# Patient Record
Sex: FEMALE | Race: WHITE | NOT HISPANIC OR LATINO | URBAN - METROPOLITAN AREA
[De-identification: names, ages, dates, MRNs, and addresses within clinical notes are randomized per-mention and may not be internally consistent; named-entity substitution may affect disease eponyms.]

---

## 2019-07-11 ENCOUNTER — OFFICE (AMBULATORY)
Dept: URBAN - METROPOLITAN AREA CLINIC 75 | Facility: CLINIC | Age: 24
End: 2019-07-11

## 2019-07-11 VITALS
HEIGHT: 65 IN | RESPIRATION RATE: 14 BRPM | DIASTOLIC BLOOD PRESSURE: 86 MMHG | SYSTOLIC BLOOD PRESSURE: 126 MMHG | HEART RATE: 69 BPM | WEIGHT: 122 LBS

## 2019-07-11 DIAGNOSIS — R13.10 DYSPHAGIA, UNSPECIFIED: ICD-10-CM

## 2019-07-11 DIAGNOSIS — K59.00 CONSTIPATION, UNSPECIFIED: ICD-10-CM

## 2019-07-11 DIAGNOSIS — R10.10 UPPER ABDOMINAL PAIN, UNSPECIFIED: ICD-10-CM

## 2019-07-11 DIAGNOSIS — R19.5 OTHER FECAL ABNORMALITIES: ICD-10-CM

## 2019-07-11 DIAGNOSIS — K21.9 GASTRO-ESOPHAGEAL REFLUX DISEASE WITHOUT ESOPHAGITIS: ICD-10-CM

## 2019-07-11 PROCEDURE — 99203 OFFICE O/P NEW LOW 30 MIN: CPT | Performed by: INTERNAL MEDICINE

## 2019-07-16 VITALS
HEART RATE: 92 BPM | WEIGHT: 122 LBS | SYSTOLIC BLOOD PRESSURE: 122 MMHG | OXYGEN SATURATION: 95 % | DIASTOLIC BLOOD PRESSURE: 78 MMHG | HEART RATE: 78 BPM | DIASTOLIC BLOOD PRESSURE: 92 MMHG | SYSTOLIC BLOOD PRESSURE: 112 MMHG | SYSTOLIC BLOOD PRESSURE: 128 MMHG | HEART RATE: 74 BPM | RESPIRATION RATE: 16 BRPM | RESPIRATION RATE: 13 BRPM | SYSTOLIC BLOOD PRESSURE: 129 MMHG | DIASTOLIC BLOOD PRESSURE: 57 MMHG | OXYGEN SATURATION: 93 % | TEMPERATURE: 99.2 F | HEART RATE: 75 BPM | RESPIRATION RATE: 22 BRPM | DIASTOLIC BLOOD PRESSURE: 74 MMHG | HEART RATE: 94 BPM | SYSTOLIC BLOOD PRESSURE: 138 MMHG | OXYGEN SATURATION: 96 % | TEMPERATURE: 98 F | HEART RATE: 90 BPM | DIASTOLIC BLOOD PRESSURE: 85 MMHG | HEART RATE: 89 BPM | OXYGEN SATURATION: 100 % | HEIGHT: 65 IN | DIASTOLIC BLOOD PRESSURE: 89 MMHG | OXYGEN SATURATION: 97 % | SYSTOLIC BLOOD PRESSURE: 107 MMHG

## 2019-07-17 ENCOUNTER — AMBULATORY SURGICAL CENTER (AMBULATORY)
Dept: URBAN - METROPOLITAN AREA SURGERY 17 | Facility: SURGERY | Age: 24
End: 2019-07-17

## 2019-07-17 ENCOUNTER — OFFICE (AMBULATORY)
Dept: URBAN - METROPOLITAN AREA PATHOLOGY 4 | Facility: PATHOLOGY | Age: 24
End: 2019-07-17

## 2019-07-17 DIAGNOSIS — R10.10 UPPER ABDOMINAL PAIN, UNSPECIFIED: ICD-10-CM

## 2019-07-17 DIAGNOSIS — R13.10 DYSPHAGIA, UNSPECIFIED: ICD-10-CM

## 2019-07-17 DIAGNOSIS — K29.50 UNSPECIFIED CHRONIC GASTRITIS WITHOUT BLEEDING: ICD-10-CM

## 2019-07-17 DIAGNOSIS — K21.9 GASTRO-ESOPHAGEAL REFLUX DISEASE WITHOUT ESOPHAGITIS: ICD-10-CM

## 2019-07-17 LAB
GI HISTOLOGY: A. SELECT: (no result)
GI HISTOLOGY: B. SELECT: (no result)
GI HISTOLOGY: C. SELECT: (no result)
GI HISTOLOGY: PDF REPORT: (no result)

## 2019-07-17 PROCEDURE — 43239 EGD BIOPSY SINGLE/MULTIPLE: CPT | Performed by: INTERNAL MEDICINE

## 2019-07-17 PROCEDURE — 88305 TISSUE EXAM BY PATHOLOGIST: CPT | Performed by: INTERNAL MEDICINE

## 2023-12-01 ENCOUNTER — OFFICE (AMBULATORY)
Dept: URBAN - METROPOLITAN AREA CLINIC 76 | Facility: CLINIC | Age: 28
End: 2023-12-01

## 2023-12-01 VITALS
DIASTOLIC BLOOD PRESSURE: 79 MMHG | WEIGHT: 124 LBS | SYSTOLIC BLOOD PRESSURE: 118 MMHG | OXYGEN SATURATION: 98 % | HEIGHT: 65 IN | HEART RATE: 88 BPM

## 2023-12-01 DIAGNOSIS — D50.9 IRON DEFICIENCY ANEMIA, UNSPECIFIED: ICD-10-CM

## 2023-12-01 DIAGNOSIS — R10.32 LEFT LOWER QUADRANT PAIN: ICD-10-CM

## 2023-12-01 DIAGNOSIS — R19.4 CHANGE IN BOWEL HABIT: ICD-10-CM

## 2023-12-01 DIAGNOSIS — R19.7 DIARRHEA, UNSPECIFIED: ICD-10-CM

## 2023-12-01 DIAGNOSIS — K21.9 GASTRO-ESOPHAGEAL REFLUX DISEASE WITHOUT ESOPHAGITIS: ICD-10-CM

## 2023-12-01 PROCEDURE — 99204 OFFICE O/P NEW MOD 45 MIN: CPT | Performed by: NURSE PRACTITIONER

## 2023-12-01 RX ORDER — RIFAXIMIN 550 MG/1
TABLET ORAL
Qty: 42 | Refills: 2 | Status: COMPLETED
Start: 2023-12-01 | End: 2023-12-20

## 2023-12-20 VITALS
DIASTOLIC BLOOD PRESSURE: 85 MMHG | DIASTOLIC BLOOD PRESSURE: 82 MMHG | RESPIRATION RATE: 18 BRPM | DIASTOLIC BLOOD PRESSURE: 93 MMHG | RESPIRATION RATE: 7 BRPM | SYSTOLIC BLOOD PRESSURE: 176 MMHG | OXYGEN SATURATION: 100 % | DIASTOLIC BLOOD PRESSURE: 84 MMHG | RESPIRATION RATE: 20 BRPM | RESPIRATION RATE: 16 BRPM | SYSTOLIC BLOOD PRESSURE: 123 MMHG | SYSTOLIC BLOOD PRESSURE: 140 MMHG | HEART RATE: 62 BPM | HEIGHT: 65 IN | HEART RATE: 87 BPM | WEIGHT: 124 LBS | SYSTOLIC BLOOD PRESSURE: 135 MMHG | DIASTOLIC BLOOD PRESSURE: 88 MMHG | DIASTOLIC BLOOD PRESSURE: 92 MMHG | SYSTOLIC BLOOD PRESSURE: 138 MMHG | HEART RATE: 67 BPM | HEART RATE: 73 BPM | RESPIRATION RATE: 14 BRPM | DIASTOLIC BLOOD PRESSURE: 66 MMHG | DIASTOLIC BLOOD PRESSURE: 94 MMHG | HEART RATE: 55 BPM | DIASTOLIC BLOOD PRESSURE: 79 MMHG | SYSTOLIC BLOOD PRESSURE: 161 MMHG | DIASTOLIC BLOOD PRESSURE: 68 MMHG | OXYGEN SATURATION: 99 % | OXYGEN SATURATION: 97 % | TEMPERATURE: 97.4 F | SYSTOLIC BLOOD PRESSURE: 139 MMHG | RESPIRATION RATE: 11 BRPM | HEART RATE: 71 BPM | HEART RATE: 68 BPM | SYSTOLIC BLOOD PRESSURE: 131 MMHG | SYSTOLIC BLOOD PRESSURE: 145 MMHG | SYSTOLIC BLOOD PRESSURE: 136 MMHG | SYSTOLIC BLOOD PRESSURE: 159 MMHG | DIASTOLIC BLOOD PRESSURE: 77 MMHG | RESPIRATION RATE: 26 BRPM | DIASTOLIC BLOOD PRESSURE: 73 MMHG

## 2023-12-22 ENCOUNTER — OFFICE (AMBULATORY)
Dept: URBAN - METROPOLITAN AREA PATHOLOGY 4 | Facility: PATHOLOGY | Age: 28
End: 2023-12-22

## 2023-12-22 ENCOUNTER — AMBULATORY SURGICAL CENTER (AMBULATORY)
Dept: URBAN - METROPOLITAN AREA SURGERY 17 | Facility: SURGERY | Age: 28
End: 2023-12-22

## 2023-12-22 DIAGNOSIS — K21.00 GASTRO-ESOPHAGEAL REFLUX DISEASE WITH ESOPHAGITIS, WITHOUT B: ICD-10-CM

## 2023-12-22 DIAGNOSIS — K31.7 POLYP OF STOMACH AND DUODENUM: ICD-10-CM

## 2023-12-22 DIAGNOSIS — K31.89 OTHER DISEASES OF STOMACH AND DUODENUM: ICD-10-CM

## 2023-12-22 DIAGNOSIS — D50.9 IRON DEFICIENCY ANEMIA, UNSPECIFIED: ICD-10-CM

## 2023-12-22 DIAGNOSIS — R10.32 LEFT LOWER QUADRANT PAIN: ICD-10-CM

## 2023-12-22 PROBLEM — K22.89 OTHER SPECIFIED DISEASE OF ESOPHAGUS: Status: ACTIVE | Noted: 2023-12-22

## 2023-12-22 PROBLEM — K58.9 IRRITABLE BOWEL SYNDROME WITHOUT DIARRHEA: Status: ACTIVE | Noted: 2023-12-22

## 2023-12-22 LAB
GI HISTOLOGY: A. UNSPECIFIED: (no result)
GI HISTOLOGY: B. UNSPECIFIED: (no result)
GI HISTOLOGY: C. UNSPECIFIED: (no result)
GI HISTOLOGY: PDF REPORT: (no result)

## 2023-12-22 PROCEDURE — 45378 DIAGNOSTIC COLONOSCOPY: CPT | Performed by: INTERNAL MEDICINE

## 2023-12-22 PROCEDURE — 88342 IMHCHEM/IMCYTCHM 1ST ANTB: CPT | Performed by: INTERNAL MEDICINE

## 2023-12-22 PROCEDURE — 88305 TISSUE EXAM BY PATHOLOGIST: CPT | Performed by: INTERNAL MEDICINE

## 2023-12-22 PROCEDURE — 43239 EGD BIOPSY SINGLE/MULTIPLE: CPT | Performed by: INTERNAL MEDICINE

## 2025-01-23 ENCOUNTER — OFFICE VISIT (OUTPATIENT)
Dept: SPORTS MEDICINE | Facility: CLINIC | Age: 30
End: 2025-01-23
Payer: COMMERCIAL

## 2025-01-23 VITALS
WEIGHT: 133.6 LBS | DIASTOLIC BLOOD PRESSURE: 85 MMHG | BODY MASS INDEX: 22.26 KG/M2 | HEART RATE: 80 BPM | SYSTOLIC BLOOD PRESSURE: 121 MMHG | TEMPERATURE: 97.9 F | OXYGEN SATURATION: 97 % | HEIGHT: 65 IN

## 2025-01-23 DIAGNOSIS — M21.6X1 PRONATION OF BOTH FEET: ICD-10-CM

## 2025-01-23 DIAGNOSIS — M21.6X2 PRONATION OF BOTH FEET: ICD-10-CM

## 2025-01-23 DIAGNOSIS — M76.821 POSTERIOR TIBIAL TENDINITIS OF BOTH LOWER EXTREMITIES: Primary | ICD-10-CM

## 2025-01-23 DIAGNOSIS — M76.822 POSTERIOR TIBIAL TENDINITIS OF BOTH LOWER EXTREMITIES: Primary | ICD-10-CM

## 2025-01-23 PROCEDURE — 99203 OFFICE O/P NEW LOW 30 MIN: CPT | Performed by: STUDENT IN AN ORGANIZED HEALTH CARE EDUCATION/TRAINING PROGRAM

## 2025-01-23 RX ORDER — AMITRIPTYLINE HYDROCHLORIDE 10 MG/1
10 TABLET ORAL NIGHTLY
COMMUNITY

## 2025-01-23 RX ORDER — NORGESTIMATE AND ETHINYL ESTRADIOL 7DAYSX3 28
1 KIT ORAL DAILY
COMMUNITY

## 2025-01-23 RX ORDER — DEXLANSOPRAZOLE 30 MG/1
30 CAPSULE, DELAYED RELEASE ORAL DAILY
COMMUNITY

## 2025-01-23 RX ORDER — ESCITALOPRAM OXALATE 10 MG/1
10 TABLET ORAL DAILY
COMMUNITY

## 2025-01-23 NOTE — PROGRESS NOTES
"Chief Complaint   Patient presents with    Right Ankle - Initial Evaluation    Left Ankle - Initial Evaluation    Pain       History of Present Illness  Linda is a 29 y.o. year old female here today for bilateral ankle pain that has been present since completing a 2 mile run on 1/16/25, but with no known injury or trauma.   History of Present Illness  She began experiencing bilateral ankle pain on 01/16/2025, following a 2-mile run on a treadmill as part of her training regimen for a mini marathon. The pain was severe enough to persist regardless of her standing or sitting position. However, she noticed a significant reduction in pain when she wore her sneakers to work the following day. By Saturday, the pain had subsided to a mild soreness, which was less severe than the initial onset.  She reports no swelling in her ankles. Pain is mainly located on the medial aspect. She resumed her running routine on Saturday, using a new pair of shoes designed for overpronation, which provided some relief. She has been running intermittently since January 2025, initially incorporating 30-second intervals into her gym workouts. She reports no history of previous injuries to either ankle. She took Advil on the day the pain was most severe. She also tried sitting with her legs crossed during lunch breaks to alleviate the pain. 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 Her occupation as an  involves prolonged sitting, although she attempts to stand for half of her workday using a standing desk.     SOCIAL HISTORY  She works as an .    MEDICATIONS  Advil         The following data was reviewed by: Rosa Isela Moore DO on 01/23/2025:  Data reviewed :          /85 (BP Location: Left arm, Patient Position: Sitting, Cuff Size: Adult)   Pulse 80   Temp 97.9 °F (36.6 °C) (Oral)   Ht 165.1 cm (65\")   Wt 60.6 kg (133 lb 9.6 oz)   SpO2 97%   BMI 22.23 kg/m²  "       Physical Exam  Vital signs reviewed.   General: Well developed, well nourished; No acute distress.  Eyes: conjunctiva clear; pupils equally round and reactive  ENT: external ears and nose atraumatic; hearing normal  CV: no peripheral edema, 2+ distal pulses  Resp: normal respiratory effort, no use of accessory muscles  Skin: normal color and pigmentation; no rashes or wounds; normal turgor  Psych: alert and oriented; mood and affect appropriate; recent and remote memory intact  Neuro: sensation to light touch intact    MSK Exam:  The bilateral feet and ankles are without obvious signs of acute bony deformity, swelling, erythema or ecchymosis. There is no tenderness to the medial joint line. There is tenderness of the posterior tibial tendon. There is no tenderness to the lateral joint line. There is no bony crepitus or step-off. There is no midfoot or forefoot tenderness. Active range of motion is full, pain-free and symmetrical. Passive range of motion is full, pain-free and symmetrical. Instability test are negative with anterior drawer, talar tilt and eversion stress tests. Tibia-fibula squeeze, calcaneal squeeze, and forefoot squeeze tests are negative. Kaiser's test and Homans sign are negative. Strength is 5/5 and equal to the opposite ankle, though painful with resisted inversion. There is mild pes planus bilaterally and pronation with ambulation.        Assessment and Plan  Diagnoses and all orders for this visit:    1. Posterior tibial tendinitis of both lower extremities (Primary)    2. Pronation of both feet    Linda is a 29 y.o. year old female here today for bilateral ankle pain that has been present since completing a 2 mile run on 1/16/25, but with no known injury or trauma.   Assessment & Plan  1. Bilateral ankle pain.  The symptoms are likely attributable to an escalation in physical activity, transitioning from treadmill running to outdoor running, and a change in footwear. The additional  stress provoked symptoms due to enriqueta underlying pronation of her feet. However, the arch support is suboptimal, leading to a tendency for her foot to collapse. She was advised to continue using her standing desk but to incorporate more sitting over the next few weeks. I stressed the importance of appropriate footwear to provide adequate support.  Should avoid being barefoot, as well as shoes that lack support, such as sandals, slides, flip-flops, flats, loafers, heels, etc. The use of orthotics or inserts in her shoes was recommended to provide additional support. She was also advised to gradually transition into using her new shoes and orthotics over the next 4 weeks. She was encouraged to maintain her running routine, provided it does not exacerbate her pain. A handout detailing exercises to alleviate her symptoms was provided, along with a resistance band for home use. For pain management, she was advised to take Advil as needed or to apply Voltaren topically.    Follow-up  The patient will follow up in 6 weeks.  All of her questions were answered and she is agreeable with the plan.    Dictated utilizing Dragon dictation.  Patient or patient representative verbalized consent for the use of Ambient Listening during the visit with  Rosa Isela Moore DO for chart documentation. 1/23/2025  8:48 EST

## 2025-03-06 ENCOUNTER — OFFICE VISIT (OUTPATIENT)
Dept: SPORTS MEDICINE | Facility: CLINIC | Age: 30
End: 2025-03-06
Payer: COMMERCIAL

## 2025-03-06 VITALS — TEMPERATURE: 98.3 F | BODY MASS INDEX: 22.16 KG/M2 | HEIGHT: 65 IN | WEIGHT: 133 LBS

## 2025-03-06 DIAGNOSIS — M76.822 POSTERIOR TIBIAL TENDINITIS OF BOTH LOWER EXTREMITIES: Primary | ICD-10-CM

## 2025-03-06 DIAGNOSIS — R29.898 HIP WEAKNESS: ICD-10-CM

## 2025-03-06 DIAGNOSIS — M76.821 POSTERIOR TIBIAL TENDINITIS OF BOTH LOWER EXTREMITIES: Primary | ICD-10-CM

## 2025-03-06 DIAGNOSIS — M25.559 GREATER TROCHANTERIC PAIN SYNDROME: ICD-10-CM

## 2025-03-06 DIAGNOSIS — M21.6X1 PRONATION OF BOTH FEET: ICD-10-CM

## 2025-03-06 DIAGNOSIS — M21.6X2 PRONATION OF BOTH FEET: ICD-10-CM

## 2025-03-06 NOTE — PROGRESS NOTES
"Chief Complaint   Patient presents with    Right Ankle - Follow-up     Ankle is doing a lot better inserts have helped with the pain    Left Ankle - Follow-up     Ankle is doing a lot better inserts have helped with the pain       History of Present Illness  Linda is a 29 y.o. year old female here today for follow-up of bilateral ankle pain that has been present since completing a 2 mile run on 1/16/25, but with no known injury or trauma.  Initial symptoms were thought to be due to an escalation in physical activity with transitioning from treadmill running to outdoor, accompanied by change in footwear.  I recommended conservative management, stressing the importance of proper footwear with use of orthotics. Please see previous notes for complete history and treatment course.   History of Present Illness  She reports a significant improvement in her condition, attributing the progress to the use of shoe inserts. She has been utilizing Powerstep inserts in her daily footwear, which she believes have been instrumental in alleviating her ankle pain. However, she does not use these inserts in her running shoes due to size constraints. She is currently training for a mini marathon scheduled for 04/26/2025, with her training regimen including a distance of 6.5 miles. Additionally, she mentions experiencing mild soreness in her left lateral hip, which she describes as a dull pain. She reports no pain in the right hip or groin area.    SOCIAL HISTORY  She works as an .    MEDICATIONS  Advil       Temp 98.3 °F (36.8 °C) (Infrared)   Ht 165.1 cm (65\")   Wt 60.3 kg (133 lb)   BMI 22.13 kg/m²        Physical Exam  MSK Exam:  The bilateral feet and ankles are without obvious signs of acute bony deformity, swelling, erythema or ecchymosis. There is no tenderness to the medial joint line. There is very mild but improved tenderness of the posterior tibial tendon. There is no tenderness to the lateral joint line. There is " no bony crepitus or step-off. There is no midfoot or forefoot tenderness. Active range of motion is full, pain-free and symmetrical. Strength is 5/5 and equal to the opposite ankle, and without pain. There is mild pes planus bilaterally and pronation with ambulation.     The left hip and pelvis are without obvious signs of acute bony deformity, obliquity, swelling, erythema, ecchymosis or instability. There is tenderness at the greater trochanter and gluteus medius, but no other pelvic bony or soft tissue tenderness. Active and passive range of motion are normal and painless. Log roll is negative. Straight leg raise test is negative. ANICETO and FADIR are negative.  Side-lying hip abduction strength is 4 -/5.         Assessment and Plan  Diagnoses and all orders for this visit:    1. Posterior tibial tendinitis of both lower extremities (Primary)    2. Pronation of both feet    3. Greater trochanteric pain syndrome    4. Hip weakness      Linda is a 29 y.o. year old female here today for follow-up of bilateral ankle pain that has been present since completing a 2 mile run on 1/16/25, but with no known injury or trauma.  Initial symptoms were thought to be due to an escalation in physical activity with transitioning from treadmill running to outdoor, accompanied by change in footwear.  I recommended conservative management, stressing the importance of proper footwear with use of orthotics.  She returns today reporting significant improvement in her ankle pain, with new complaint of left lateral hip pain.  Assessment & Plan  The patient's left hip pain is likely due to weak muscles that help stabilize the hips.  History and exam consistent with greater trochanter pain/gluteus medius tendinitis.  The right hip is occasionally tender but not as problematic. Strengthening exercises have been provided to address the muscle weakness. Over-the-counter pain relievers such as ibuprofen or Tylenol can be used for pain  management. Topical Voltaren can be applied 3 to 4 times daily, extending the application into the buttock area. She may continue with activity, including running, as tolerated but should avoid painful or overly strenuous activity.      We will follow-up as needed.    All of her questions were answered and she is agreeable with the plan.    Dictated utilizing Dragon dictation.  Patient or patient representative verbalized consent for the use of Ambient Listening during the visit with  Rosa Isela Moore DO for chart documentation. 3/6/2025  8:55 EST